# Patient Record
Sex: FEMALE | Race: WHITE | Employment: STUDENT | URBAN - METROPOLITAN AREA
[De-identification: names, ages, dates, MRNs, and addresses within clinical notes are randomized per-mention and may not be internally consistent; named-entity substitution may affect disease eponyms.]

---
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
## 2024-11-25 ENCOUNTER — LAB REQUISITION (OUTPATIENT)
Dept: LAB | Facility: HOSPITAL | Age: 19
End: 2024-11-25

## 2024-11-25 ENCOUNTER — LAB (OUTPATIENT)
Dept: LAB | Facility: LAB | Age: 19
End: 2024-11-25

## 2024-11-25 DIAGNOSIS — Z34.90 ENCOUNTER FOR SUPERVISION OF NORMAL PREGNANCY, UNSPECIFIED, UNSPECIFIED TRIMESTER: ICD-10-CM

## 2024-11-25 DIAGNOSIS — Z34.90 ENCOUNTER FOR SUPERVISION OF NORMAL PREGNANCY, UNSPECIFIED, UNSPECIFIED TRIMESTER: Primary | ICD-10-CM

## 2024-11-25 LAB
ABO GROUP (TYPE) IN BLOOD: NORMAL
ANTIBODY SCREEN: NORMAL
ERYTHROCYTE [DISTWIDTH] IN BLOOD BY AUTOMATED COUNT: 12.6 % (ref 11.5–14.5)
GLUCOSE 1H P 50 G GLC PO SERPL-MCNC: 122 MG/DL
HBV SURFACE AB SER-ACNC: <3.1 MIU/ML
HCT VFR BLD AUTO: 33.6 % (ref 36–46)
HCV AB SER QL: NONREACTIVE
HGB BLD-MCNC: 11.3 G/DL (ref 12–16)
HIV 1+2 AB+HIV1 P24 AG SERPL QL IA: NONREACTIVE
MCH RBC QN AUTO: 30.2 PG (ref 26–34)
MCHC RBC AUTO-ENTMCNC: 33.6 G/DL (ref 32–36)
MCV RBC AUTO: 90 FL (ref 80–100)
NRBC BLD-RTO: 0 /100 WBCS (ref 0–0)
PLATELET # BLD AUTO: 210 X10*3/UL (ref 150–450)
RBC # BLD AUTO: 3.74 X10*6/UL (ref 4–5.2)
REFLEX ADDED, ANEMIA PANEL: NORMAL
RH FACTOR (ANTIGEN D): NORMAL
RUBV IGG SERPL IA-ACNC: 1 IA
RUBV IGG SERPL QL IA: POSITIVE
TREPONEMA PALLIDUM IGG+IGM AB [PRESENCE] IN SERUM OR PLASMA BY IMMUNOASSAY: NONREACTIVE
WBC # BLD AUTO: 8.7 X10*3/UL (ref 4.4–11.3)

## 2024-11-25 PROCEDURE — 87491 CHLMYD TRACH DNA AMP PROBE: CPT

## 2024-11-25 PROCEDURE — 87389 HIV-1 AG W/HIV-1&-2 AB AG IA: CPT

## 2024-11-25 PROCEDURE — 86901 BLOOD TYPING SEROLOGIC RH(D): CPT

## 2024-11-25 PROCEDURE — 86317 IMMUNOASSAY INFECTIOUS AGENT: CPT

## 2024-11-25 PROCEDURE — 85027 COMPLETE CBC AUTOMATED: CPT

## 2024-11-25 PROCEDURE — 86706 HEP B SURFACE ANTIBODY: CPT

## 2024-11-25 PROCEDURE — 86780 TREPONEMA PALLIDUM: CPT

## 2024-11-25 PROCEDURE — 86900 BLOOD TYPING SEROLOGIC ABO: CPT

## 2024-11-25 PROCEDURE — 86803 HEPATITIS C AB TEST: CPT

## 2024-11-25 PROCEDURE — 87591 N.GONORRHOEAE DNA AMP PROB: CPT

## 2024-11-25 PROCEDURE — 82947 ASSAY GLUCOSE BLOOD QUANT: CPT

## 2024-11-25 PROCEDURE — 36415 COLL VENOUS BLD VENIPUNCTURE: CPT

## 2024-11-25 PROCEDURE — 86850 RBC ANTIBODY SCREEN: CPT

## 2024-11-25 PROCEDURE — 87086 URINE CULTURE/COLONY COUNT: CPT

## 2024-11-26 LAB
C TRACH RRNA SPEC QL NAA+PROBE: NEGATIVE
N GONORRHOEA DNA SPEC QL PROBE+SIG AMP: NEGATIVE

## 2024-11-27 LAB — BACTERIA UR CULT: NO GROWTH

## 2025-01-03 ENCOUNTER — LAB REQUISITION (OUTPATIENT)
Dept: LAB | Facility: HOSPITAL | Age: 20
End: 2025-01-03
Payer: COMMERCIAL

## 2025-01-03 DIAGNOSIS — Z34.90 ENCOUNTER FOR SUPERVISION OF NORMAL PREGNANCY, UNSPECIFIED, UNSPECIFIED TRIMESTER: ICD-10-CM

## 2025-01-03 PROCEDURE — 87081 CULTURE SCREEN ONLY: CPT

## 2025-01-05 LAB — GP B STREP GENITAL QL CULT: NORMAL

## 2025-01-06 LAB — GP B STREP GENITAL QL CULT: NORMAL

## 2025-01-20 ENCOUNTER — HOSPITAL ENCOUNTER (INPATIENT)
Facility: HOSPITAL | Age: 20
LOS: 1 days | Discharge: HOME | End: 2025-01-21
Attending: OBSTETRICS & GYNECOLOGY | Admitting: OBSTETRICS & GYNECOLOGY
Payer: COMMERCIAL

## 2025-01-20 ENCOUNTER — ANESTHESIA EVENT (OUTPATIENT)
Dept: OBSTETRICS AND GYNECOLOGY | Facility: HOSPITAL | Age: 20
End: 2025-01-20
Payer: COMMERCIAL

## 2025-01-20 ENCOUNTER — ANESTHESIA (OUTPATIENT)
Dept: OBSTETRICS AND GYNECOLOGY | Facility: HOSPITAL | Age: 20
End: 2025-01-20
Payer: COMMERCIAL

## 2025-01-20 ENCOUNTER — APPOINTMENT (OUTPATIENT)
Dept: OBSTETRICS AND GYNECOLOGY | Facility: HOSPITAL | Age: 20
End: 2025-01-20
Payer: COMMERCIAL

## 2025-01-20 PROBLEM — Z34.90 TERM PREGNANCY (HHS-HCC): Status: ACTIVE | Noted: 2025-01-20

## 2025-01-20 LAB
ABO GROUP (TYPE) IN BLOOD: NORMAL
ANTIBODY SCREEN: NORMAL
BB ANTIBODY IDENTIFICATION: NORMAL
CASE #: NORMAL
ERYTHROCYTE [DISTWIDTH] IN BLOOD BY AUTOMATED COUNT: 13.2 % (ref 11.5–14.5)
HCT VFR BLD AUTO: 33.9 % (ref 36–46)
HGB BLD-MCNC: 11.2 G/DL (ref 12–16)
MCH RBC QN AUTO: 29.5 PG (ref 26–34)
MCHC RBC AUTO-ENTMCNC: 33 G/DL (ref 32–36)
MCV RBC AUTO: 89 FL (ref 80–100)
NRBC BLD-RTO: 0 /100 WBCS (ref 0–0)
PLATELET # BLD AUTO: 220 X10*3/UL (ref 150–450)
RBC # BLD AUTO: 3.8 X10*6/UL (ref 4–5.2)
RH FACTOR (ANTIGEN D): NORMAL
TREPONEMA PALLIDUM IGG+IGM AB [PRESENCE] IN SERUM OR PLASMA BY IMMUNOASSAY: NONREACTIVE
WBC # BLD AUTO: 13 X10*3/UL (ref 4.4–11.3)

## 2025-01-20 PROCEDURE — 86780 TREPONEMA PALLIDUM: CPT | Mod: GEALAB | Performed by: OBSTETRICS & GYNECOLOGY

## 2025-01-20 PROCEDURE — 85027 COMPLETE CBC AUTOMATED: CPT | Performed by: OBSTETRICS & GYNECOLOGY

## 2025-01-20 PROCEDURE — 7210000002 HC LABOR PER HOUR

## 2025-01-20 PROCEDURE — 3E033VJ INTRODUCTION OF OTHER HORMONE INTO PERIPHERAL VEIN, PERCUTANEOUS APPROACH: ICD-10-PCS | Performed by: OBSTETRICS & GYNECOLOGY

## 2025-01-20 PROCEDURE — 01967 NEURAXL LBR ANES VAG DLVR: CPT | Performed by: NURSE ANESTHETIST, CERTIFIED REGISTERED

## 2025-01-20 PROCEDURE — 2500000004 HC RX 250 GENERAL PHARMACY W/ HCPCS (ALT 636 FOR OP/ED): Performed by: NURSE ANESTHETIST, CERTIFIED REGISTERED

## 2025-01-20 PROCEDURE — 3E0P7GC INTRODUCTION OF OTHER THERAPEUTIC SUBSTANCE INTO FEMALE REPRODUCTIVE, VIA NATURAL OR ARTIFICIAL OPENING: ICD-10-PCS | Performed by: OBSTETRICS & GYNECOLOGY

## 2025-01-20 PROCEDURE — 86870 RBC ANTIBODY IDENTIFICATION: CPT

## 2025-01-20 PROCEDURE — 3700000014 EPIDURAL BLOCK: Performed by: NURSE ANESTHETIST, CERTIFIED REGISTERED

## 2025-01-20 PROCEDURE — 59050 FETAL MONITOR W/REPORT: CPT

## 2025-01-20 PROCEDURE — 36415 COLL VENOUS BLD VENIPUNCTURE: CPT | Performed by: OBSTETRICS & GYNECOLOGY

## 2025-01-20 PROCEDURE — 1120000001 HC OB PRIVATE ROOM DAILY

## 2025-01-20 PROCEDURE — 10907ZC DRAINAGE OF AMNIOTIC FLUID, THERAPEUTIC FROM PRODUCTS OF CONCEPTION, VIA NATURAL OR ARTIFICIAL OPENING: ICD-10-PCS | Performed by: OBSTETRICS & GYNECOLOGY

## 2025-01-20 PROCEDURE — 2500000001 HC RX 250 WO HCPCS SELF ADMINISTERED DRUGS (ALT 637 FOR MEDICARE OP): Performed by: OBSTETRICS & GYNECOLOGY

## 2025-01-20 PROCEDURE — 2500000004 HC RX 250 GENERAL PHARMACY W/ HCPCS (ALT 636 FOR OP/ED): Performed by: OBSTETRICS & GYNECOLOGY

## 2025-01-20 PROCEDURE — 0HQ9XZZ REPAIR PERINEUM SKIN, EXTERNAL APPROACH: ICD-10-PCS | Performed by: OBSTETRICS & GYNECOLOGY

## 2025-01-20 PROCEDURE — 59409 OBSTETRICAL CARE: CPT | Performed by: OBSTETRICS & GYNECOLOGY

## 2025-01-20 PROCEDURE — 51701 INSERT BLADDER CATHETER: CPT

## 2025-01-20 PROCEDURE — 7100000016 HC LABOR RECOVERY PER HOUR

## 2025-01-20 PROCEDURE — 86850 RBC ANTIBODY SCREEN: CPT | Performed by: OBSTETRICS & GYNECOLOGY

## 2025-01-20 RX ORDER — LABETALOL HYDROCHLORIDE 5 MG/ML
20 INJECTION, SOLUTION INTRAVENOUS ONCE AS NEEDED
Status: DISCONTINUED | OUTPATIENT
Start: 2025-01-20 | End: 2025-01-21 | Stop reason: HOSPADM

## 2025-01-20 RX ORDER — ONDANSETRON HYDROCHLORIDE 2 MG/ML
4 INJECTION, SOLUTION INTRAVENOUS EVERY 6 HOURS PRN
Status: DISCONTINUED | OUTPATIENT
Start: 2025-01-20 | End: 2025-01-21 | Stop reason: HOSPADM

## 2025-01-20 RX ORDER — LIDOCAINE 560 MG/1
1 PATCH PERCUTANEOUS; TOPICAL; TRANSDERMAL
Status: DISCONTINUED | OUTPATIENT
Start: 2025-01-20 | End: 2025-01-21 | Stop reason: HOSPADM

## 2025-01-20 RX ORDER — POLYETHYLENE GLYCOL 3350 17 G/17G
17 POWDER, FOR SOLUTION ORAL 2 TIMES DAILY PRN
Status: DISCONTINUED | OUTPATIENT
Start: 2025-01-20 | End: 2025-01-21 | Stop reason: HOSPADM

## 2025-01-20 RX ORDER — BUPIVACAINE HYDROCHLORIDE 2.5 MG/ML
INJECTION, SOLUTION EPIDURAL; INFILTRATION; INTRACAUDAL AS NEEDED
Status: DISCONTINUED | OUTPATIENT
Start: 2025-01-20 | End: 2025-01-20

## 2025-01-20 RX ORDER — METHYLERGONOVINE MALEATE 0.2 MG/ML
0.2 INJECTION INTRAVENOUS ONCE AS NEEDED
Status: DISCONTINUED | OUTPATIENT
Start: 2025-01-20 | End: 2025-01-21 | Stop reason: HOSPADM

## 2025-01-20 RX ORDER — LIDOCAINE HYDROCHLORIDE 10 MG/ML
30 INJECTION, SOLUTION INFILTRATION; PERINEURAL ONCE AS NEEDED
Status: DISCONTINUED | OUTPATIENT
Start: 2025-01-20 | End: 2025-01-21 | Stop reason: HOSPADM

## 2025-01-20 RX ORDER — CARBOPROST TROMETHAMINE 250 UG/ML
250 INJECTION, SOLUTION INTRAMUSCULAR ONCE AS NEEDED
Status: DISCONTINUED | OUTPATIENT
Start: 2025-01-20 | End: 2025-01-21 | Stop reason: HOSPADM

## 2025-01-20 RX ORDER — LOPERAMIDE HYDROCHLORIDE 2 MG/1
4 CAPSULE ORAL EVERY 2 HOUR PRN
Status: DISCONTINUED | OUTPATIENT
Start: 2025-01-20 | End: 2025-01-21 | Stop reason: HOSPADM

## 2025-01-20 RX ORDER — BISACODYL 10 MG/1
10 SUPPOSITORY RECTAL DAILY PRN
Status: DISCONTINUED | OUTPATIENT
Start: 2025-01-20 | End: 2025-01-21 | Stop reason: HOSPADM

## 2025-01-20 RX ORDER — SIMETHICONE 80 MG
80 TABLET,CHEWABLE ORAL 4 TIMES DAILY PRN
Status: DISCONTINUED | OUTPATIENT
Start: 2025-01-20 | End: 2025-01-21 | Stop reason: HOSPADM

## 2025-01-20 RX ORDER — TERBUTALINE SULFATE 1 MG/ML
0.25 INJECTION SUBCUTANEOUS ONCE AS NEEDED
Status: DISCONTINUED | OUTPATIENT
Start: 2025-01-20 | End: 2025-01-21 | Stop reason: HOSPADM

## 2025-01-20 RX ORDER — ONDANSETRON 4 MG/1
4 TABLET, FILM COATED ORAL EVERY 6 HOURS PRN
Status: DISCONTINUED | OUTPATIENT
Start: 2025-01-20 | End: 2025-01-21 | Stop reason: HOSPADM

## 2025-01-20 RX ORDER — NIFEDIPINE 10 MG/1
10 CAPSULE ORAL ONCE AS NEEDED
Status: DISCONTINUED | OUTPATIENT
Start: 2025-01-20 | End: 2025-01-21 | Stop reason: HOSPADM

## 2025-01-20 RX ORDER — OXYTOCIN/0.9 % SODIUM CHLORIDE 30/500 ML
60 PLASTIC BAG, INJECTION (ML) INTRAVENOUS ONCE AS NEEDED
Status: DISCONTINUED | OUTPATIENT
Start: 2025-01-20 | End: 2025-01-21 | Stop reason: HOSPADM

## 2025-01-20 RX ORDER — OXYTOCIN 10 [USP'U]/ML
10 INJECTION, SOLUTION INTRAMUSCULAR; INTRAVENOUS ONCE AS NEEDED
Status: DISCONTINUED | OUTPATIENT
Start: 2025-01-20 | End: 2025-01-21 | Stop reason: HOSPADM

## 2025-01-20 RX ORDER — HYDRALAZINE HYDROCHLORIDE 20 MG/ML
5 INJECTION INTRAMUSCULAR; INTRAVENOUS ONCE AS NEEDED
Status: DISCONTINUED | OUTPATIENT
Start: 2025-01-20 | End: 2025-01-21 | Stop reason: HOSPADM

## 2025-01-20 RX ORDER — OXYTOCIN 10 [USP'U]/ML
10 INJECTION, SOLUTION INTRAMUSCULAR; INTRAVENOUS ONCE AS NEEDED
Status: COMPLETED | OUTPATIENT
Start: 2025-01-20 | End: 2025-01-20

## 2025-01-20 RX ORDER — OXYTOCIN/0.9 % SODIUM CHLORIDE 30/500 ML
2-30 PLASTIC BAG, INJECTION (ML) INTRAVENOUS CONTINUOUS
Status: DISCONTINUED | OUTPATIENT
Start: 2025-01-20 | End: 2025-01-21 | Stop reason: HOSPADM

## 2025-01-20 RX ORDER — MISOPROSTOL 200 UG/1
800 TABLET ORAL ONCE AS NEEDED
Status: DISCONTINUED | OUTPATIENT
Start: 2025-01-20 | End: 2025-01-21 | Stop reason: HOSPADM

## 2025-01-20 RX ORDER — TRANEXAMIC ACID 100 MG/ML
1000 INJECTION, SOLUTION INTRAVENOUS ONCE AS NEEDED
Status: DISCONTINUED | OUTPATIENT
Start: 2025-01-20 | End: 2025-01-21 | Stop reason: HOSPADM

## 2025-01-20 RX ORDER — ACETAMINOPHEN 325 MG/1
975 TABLET ORAL EVERY 6 HOURS
Status: DISCONTINUED | OUTPATIENT
Start: 2025-01-20 | End: 2025-01-21 | Stop reason: HOSPADM

## 2025-01-20 RX ORDER — FENTANYL/ROPIVACAINE/NS/PF 2MCG/ML-.2
0-25 PLASTIC BAG, INJECTION (ML) INJECTION CONTINUOUS
Status: DISCONTINUED | OUTPATIENT
Start: 2025-01-20 | End: 2025-01-21 | Stop reason: HOSPADM

## 2025-01-20 RX ORDER — LIDOCAINE HCL/EPINEPHRINE/PF 2%-1:200K
VIAL (ML) INJECTION AS NEEDED
Status: DISCONTINUED | OUTPATIENT
Start: 2025-01-20 | End: 2025-01-20

## 2025-01-20 RX ORDER — METOCLOPRAMIDE HYDROCHLORIDE 5 MG/ML
10 INJECTION INTRAMUSCULAR; INTRAVENOUS EVERY 6 HOURS PRN
Status: DISCONTINUED | OUTPATIENT
Start: 2025-01-20 | End: 2025-01-21 | Stop reason: HOSPADM

## 2025-01-20 RX ORDER — ADHESIVE BANDAGE
10 BANDAGE TOPICAL
Status: DISCONTINUED | OUTPATIENT
Start: 2025-01-20 | End: 2025-01-21 | Stop reason: HOSPADM

## 2025-01-20 RX ORDER — METOCLOPRAMIDE 10 MG/1
10 TABLET ORAL EVERY 6 HOURS PRN
Status: DISCONTINUED | OUTPATIENT
Start: 2025-01-20 | End: 2025-01-21 | Stop reason: HOSPADM

## 2025-01-20 RX ORDER — DIPHENHYDRAMINE HYDROCHLORIDE 50 MG/ML
25 INJECTION INTRAMUSCULAR; INTRAVENOUS EVERY 6 HOURS PRN
Status: DISCONTINUED | OUTPATIENT
Start: 2025-01-20 | End: 2025-01-21 | Stop reason: HOSPADM

## 2025-01-20 RX ORDER — DIPHENHYDRAMINE HCL 25 MG
25 CAPSULE ORAL EVERY 6 HOURS PRN
Status: DISCONTINUED | OUTPATIENT
Start: 2025-01-20 | End: 2025-01-21 | Stop reason: HOSPADM

## 2025-01-20 RX ORDER — IBUPROFEN 600 MG/1
600 TABLET ORAL EVERY 6 HOURS
Status: DISCONTINUED | OUTPATIENT
Start: 2025-01-20 | End: 2025-01-21 | Stop reason: HOSPADM

## 2025-01-20 RX ORDER — WATER
125 LIQUID (ML) MISCELLANEOUS
Status: DISCONTINUED | OUTPATIENT
Start: 2025-01-20 | End: 2025-01-21 | Stop reason: HOSPADM

## 2025-01-20 RX ADMIN — OXYTOCIN 10 UNITS: 10 INJECTION INTRAVENOUS at 13:56

## 2025-01-20 RX ADMIN — BUPIVACAINE HYDROCHLORIDE 8 ML: 2.5 INJECTION, SOLUTION EPIDURAL; INFILTRATION; INTRACAUDAL; PERINEURAL at 12:15

## 2025-01-20 RX ADMIN — MISOPROSTOL 25 MCG: 100 TABLET ORAL at 07:19

## 2025-01-20 RX ADMIN — ACETAMINOPHEN 975 MG: 325 TABLET, FILM COATED ORAL at 19:36

## 2025-01-20 RX ADMIN — IBUPROFEN 600 MG: 600 TABLET, FILM COATED ORAL at 19:36

## 2025-01-20 RX ADMIN — LIDOCAINE HYDROCHLORIDE AND EPINEPHRINE 5 ML: 20; 5 INJECTION, SOLUTION EPIDURAL; INFILTRATION; INTRACAUDAL; PERINEURAL at 10:37

## 2025-01-20 RX ADMIN — Medication 10 ML/HR: at 10:45

## 2025-01-20 RX ADMIN — BUPIVACAINE HYDROCHLORIDE 2 ML: 2.5 INJECTION, SOLUTION EPIDURAL; INFILTRATION; INTRACAUDAL; PERINEURAL at 10:43

## 2025-01-20 RX ADMIN — SODIUM CHLORIDE 500 ML: 9 INJECTION, SOLUTION INTRAVENOUS at 09:38

## 2025-01-20 SDOH — HEALTH STABILITY: MENTAL HEALTH: HAVE YOU USED ANY PRESCRIPTION DRUGS OTHER THAN PRESCRIBED IN THE PAST 12 MONTHS?: NO

## 2025-01-20 SDOH — SOCIAL STABILITY: SOCIAL INSECURITY: ARE THERE ANY APPARENT SIGNS OF INJURIES/BEHAVIORS THAT COULD BE RELATED TO ABUSE/NEGLECT?: NO

## 2025-01-20 SDOH — HEALTH STABILITY: MENTAL HEALTH: WERE YOU ABLE TO COMPLETE ALL THE BEHAVIORAL HEALTH SCREENINGS?: YES

## 2025-01-20 SDOH — ECONOMIC STABILITY: HOUSING INSECURITY: DO YOU FEEL UNSAFE GOING BACK TO THE PLACE WHERE YOU ARE LIVING?: NO

## 2025-01-20 SDOH — SOCIAL STABILITY: SOCIAL INSECURITY: HAVE YOU HAD THOUGHTS OF HARMING ANYONE ELSE?: NO

## 2025-01-20 SDOH — HEALTH STABILITY: MENTAL HEALTH: WISH TO BE DEAD (PAST 1 MONTH): NO

## 2025-01-20 SDOH — SOCIAL STABILITY: SOCIAL INSECURITY: DOES ANYONE TRY TO KEEP YOU FROM HAVING/CONTACTING OTHER FRIENDS OR DOING THINGS OUTSIDE YOUR HOME?: NO

## 2025-01-20 SDOH — SOCIAL STABILITY: SOCIAL INSECURITY: HAVE YOU HAD ANY THOUGHTS OF HARMING ANYONE ELSE?: NO

## 2025-01-20 SDOH — SOCIAL STABILITY: SOCIAL INSECURITY: PHYSICAL ABUSE: DENIES

## 2025-01-20 SDOH — SOCIAL STABILITY: SOCIAL INSECURITY: DO YOU FEEL ANYONE HAS EXPLOITED OR TAKEN ADVANTAGE OF YOU FINANCIALLY OR OF YOUR PERSONAL PROPERTY?: NO

## 2025-01-20 SDOH — HEALTH STABILITY: MENTAL HEALTH: HAVE YOU USED ANY SUBSTANCES (CANABIS, COCAINE, HEROIN, HALLUCINOGENS, INHALANTS, ETC.) IN THE PAST 12 MONTHS?: NO

## 2025-01-20 SDOH — HEALTH STABILITY: MENTAL HEALTH: SUICIDAL BEHAVIOR (LIFETIME): NO

## 2025-01-20 SDOH — SOCIAL STABILITY: SOCIAL INSECURITY: ABUSE SCREEN: ADULT

## 2025-01-20 SDOH — HEALTH STABILITY: MENTAL HEALTH: NON-SPECIFIC ACTIVE SUICIDAL THOUGHTS (PAST 1 MONTH): NO

## 2025-01-20 SDOH — SOCIAL STABILITY: SOCIAL INSECURITY: VERBAL ABUSE: DENIES

## 2025-01-20 SDOH — SOCIAL STABILITY: SOCIAL INSECURITY: ARE YOU OR HAVE YOU BEEN THREATENED OR ABUSED PHYSICALLY, EMOTIONALLY, OR SEXUALLY BY ANYONE?: NO

## 2025-01-20 SDOH — SOCIAL STABILITY: SOCIAL INSECURITY: HAS ANYONE EVER THREATENED TO HURT YOUR FAMILY OR YOUR PETS?: NO

## 2025-01-20 ASSESSMENT — ACTIVITIES OF DAILY LIVING (ADL): LACK_OF_TRANSPORTATION: NO

## 2025-01-20 ASSESSMENT — PAIN SCALES - GENERAL
PAINLEVEL_OUTOF10: 1
PAIN_LEVEL: 0
PAINLEVEL_OUTOF10: 0 - NO PAIN
PAINLEVEL_OUTOF10: 3

## 2025-01-20 ASSESSMENT — LIFESTYLE VARIABLES
AUDIT-C TOTAL SCORE: 0
AUDIT-C TOTAL SCORE: 0
HOW OFTEN DO YOU HAVE 6 OR MORE DRINKS ON ONE OCCASION: NEVER
HOW OFTEN DO YOU HAVE A DRINK CONTAINING ALCOHOL: NEVER
HOW MANY STANDARD DRINKS CONTAINING ALCOHOL DO YOU HAVE ON A TYPICAL DAY: PATIENT DOES NOT DRINK
SKIP TO QUESTIONS 9-10: 1

## 2025-01-20 ASSESSMENT — PATIENT HEALTH QUESTIONNAIRE - PHQ9
2. FEELING DOWN, DEPRESSED OR HOPELESS: NOT AT ALL
SUM OF ALL RESPONSES TO PHQ9 QUESTIONS 1 & 2: 0
1. LITTLE INTEREST OR PLEASURE IN DOING THINGS: NOT AT ALL

## 2025-01-20 ASSESSMENT — PAIN DESCRIPTION - DESCRIPTORS: DESCRIPTORS: CRAMPING

## 2025-01-20 ASSESSMENT — PAIN DESCRIPTION - LOCATION: LOCATION: ABDOMEN

## 2025-01-20 NOTE — ANESTHESIA PROCEDURE NOTES
Epidural Block    Patient location during procedure: OB  Start time: 1/20/2025 10:33 AM  End time: 1/20/2025 11:37 AM  Reason for block: labor analgesia  Staffing  Performed: CRNA   Authorized by: MARGARITA Bishop    Performed by: MARGARITA Bishop    Preanesthetic Checklist  Completed: patient identified, IV checked, risks and benefits discussed, surgical consent, pre-op evaluation, timeout performed and sterile techniques followed  Block Timeout  RN/Licensed healthcare professional reads aloud to the Anesthesia provider and entire team: Patient identity, procedure with side and site, patient position, and as applicable the availability of implants/special equipment/special requirements.  Patient on coagulant treatment: no  Timeout performed at: 1/20/2025 10:33 AM  Block Placement  Patient position: sitting  Prep: ChloraPrep  Sterility prep: drape, gloves and mask  Sedation level: no sedation  Patient monitoring: blood pressure, continuous pulse oximetry and heart rate  Approach: midline  Local numbing: lidocaine 1% to skin and subcutaneous tissues  Vertebral space: lumbar  Lumbar location: L4-L5  Epidural  Loss of resistance technique: saline  Guidance: landmark technique        Needle  Needle type: Tuohy   Needle gauge: 18  Catheter type: multi-orifice  Catheter size: 20 G  Catheter at skin depth: 12 cm  Catheter securement method: clear occlusive dressing    Test dose: lidocaine 1.5% with epinephrine 1-to-200,000  Test dose: lidocaine 1.5% with epinephrine 1-to-200,000  Test dose result: no positive test dose            Assessment  Sensory level: T6 bilateral  Block outcome: patient comfortable  Number of attempts: 1  Events: no positive test dose  Procedure assessment: patient tolerated procedure well with no immediate complications

## 2025-01-20 NOTE — LACTATION NOTE
"This note was copied from a baby's chart.  Lactation Consultant Note  Lactation Consultation  Reason for Consult: Initial assessment  Consultant Name: KALINA Narvaez IBCLC    Maternal Information  Has mother  before?: Yes  How long did the mother previously breastfeed?: 3 months  Infant to breast within first 2 hours of birth?: Yes  Exclusive Pump and Bottle Feed: No    Maternal Assessment  Breast Assessment: Medium, Soft, Warm, Compressible  Nipple Assessment: Intact, Erect  Areola Assessment: Normal    Infant Assessment  Infant Behavior: Awake, Active alert, Fussy    Feeding Assessment  Nutrition Source: Breastmilk  Feeding Method: Nursing at the breast  Feeding Position: C - hold, Cradle, Skin to skin  Suck/Feeding: Sustained, Coordinated suck/swallow/breathe, Baby led rhythmically, Audible swallowing with stimulaton  Latch Assessment: Moderate assistance is needed, Deep latch obtained, Latch achieved after repeated attempts, Comfortable with no pain, Flanged lips    LATCH TOOL  Latch: Grasps breast, tongue down, lips flanged, rhythmic sucking  Audible Swallowing: A few with stimulation  Type of Nipple: Everted (After stimulation)  Comfort (Breast/Nipple): Soft/non-tender  Hold (Positioning): Minimal assist, teach one side, mother does other, staff holds  LATCH Score: 8    Breast Pump       Other OB Lactation Tools       Patient Follow-up  Inpatient Lactation Follow-up Needed : Yes  Outpatient Lactation Follow-up: Recommended    Other OB Lactation Documentation       Recommendations/Summary  LC at bedside for initial lactation consultation. Mother reports that she plans to breastfeed infant for at least 3 months. Mother reports that she breast fed her first infant for 3 months and had no major issues. Mother denies any breast changes during this pregnancy and also denies any history of breast surgery. Mother has a breast pump at home for personal use. CDC\"s How to Keep Your Breast Pump Kit Clean handout given " and reviewed.  Mother has infant skin to skin at this time. Infant is showing hunger cues. LC reviewed hunger cues and rousing techniques with mother and grandmother. Mother moved infant down to cradle hold on her left breast. LC reviewed belly to belly and nipple to nose with mother. LC also reviewed that her nipple needs to hit the roof of infant's mouth to stimulate him to suck. Infant was inially too high over the breast and infant was attempting to latch, but could not register the nipple. LC had mother bring infant down and align him nipple to nose. Infant then able to latch to breast. LC encouraged mother to hold and shape her breast to encouraged and maintain a deep latch. Deep latch reviewed at this time and infant has both lips flanged. Mother reports latch as comfortable and gentle tug. Normal  behavior reviewed with mother and grandmother at this time. LC encouraged mother to wake infant every 2-3 hours to feed ( or sooner with cues). Hand expression reviewed at this time and mother was encouraged to utilize hand expression if infant is sleepy or does not feed well over the next 24 hours. Hand expression kit at bedside.   Infant continues to feed actively on left breast. Mother was asked if she had any questions or concerns at this time. Mother denies any questions or concerns. LC to return in 20 minutes to assess how feeding is going.     Update at 1538: Infant continues to feed well on left breast. Mother declines needing any assistance at this time.    Offered ongoing assistance with lactation.

## 2025-01-20 NOTE — H&P
OB Admission H&P    Assessment/Plan    Dina Goncalves is a 19 y.o.  at 39w0d, MARIUSZ: 2025, by Ultrasound, who presents for Induction of Labor.    Plan  -Admit to L&D, consented  -T&S, CBC, and Syphilis  -Epidural at patient request  -Recheck as clinically indicated by maternal or fetal status  -Plan to initiate induction with cytotec      Fetal Status  -NST reactive, reassuring   -Presentation vertex based on Leopold's maneuver  -EFW 6lb8oz by leopold  -GBS neg    Postpartum  Contraception Plan: patient declined    Pregnancy Problems (from 25 to present)       No problems associated with this episode.            Subjective   Good fetal movement.  Denies vaginal bleeding., Denies contractions., Denies leaking of fluid.      Prenatal Provider Ayden Arriaga MD      OB History    Para Term  AB Living   2 1 1 0 0 1   SAB IAB Ectopic Multiple Live Births   0 0 0 0 1      # Outcome Date GA Lbr Loc/2nd Weight Sex Type Anes PTL Lv   2 Current            1 Term 23 39w0d  2.835 kg F Vag-Spont EPI N GRETA       No past surgical history on file.    Social History     Tobacco Use    Smoking status: Never    Smokeless tobacco: Never   Substance Use Topics    Alcohol use: Not Currently       No Known Allergies    Medications Prior to Admission   Medication Sig Dispense Refill Last Dose/Taking    prenatal vit no.124/iron/folic (PRENATAL VITAMIN ORAL) Take 1 tablet by mouth once daily.        Objective     Last Vitals  Temp Pulse Resp BP MAP O2 Sat   37.1 °C (98.8 °F) 81 16 109/70 84 98 %     Blood Pressures         2025  0627 2025  0810          BP: 116/76 109/70               Physical Exam  General: NAD, mood appropriate  Cardiopulmonary: warm and well perfused, breathing comfortably on room air  Abdomen: Gravid, non-tender  Extremities: Symmetric  Speculum Exam: deferred  Cervix: 3 / 70/-1      Fetal Monitoring  Baseline: 150 bpm, Variability: moderate,  Accelerations:  present and Decelerations: none  Uterine Activity: Irregular contractions  Interpretation: Reactive    Bedside ultrasound: No    Labs in chart were reviewed.   Results from last 7 days   Lab Units 01/20/25  0637   WBC AUTO x10*3/uL 13.0*   HEMOGLOBIN g/dL 11.2*   HEMATOCRIT % 33.9*   PLATELETS AUTO x10*3/uL 220

## 2025-01-20 NOTE — PROGRESS NOTES
Feeling comfortable with her epidural.    FHR: Category 1, moderate variability, no decelerations  Contractions every 3 minutes  Cervix: 6/90/-1  AROM done for clear fluid    S/p one dose of Cytotec, no further medication needed at this time for induction. Continue with expectant management.  Dr. Arriaga updated.

## 2025-01-20 NOTE — ANESTHESIA PREPROCEDURE EVALUATION
Patient: Dina Goncalves    Evaluation Method: In-person visit    Procedure Information    Date: 01/20/25  Procedure: Labor Analgesia         Relevant Problems   Anesthesia (within normal limits)      Cardiac (within normal limits)      Pulmonary (within normal limits)      Neuro (within normal limits)      GI (within normal limits)      /Renal (within normal limits)      Liver (within normal limits)      Endocrine (within normal limits)      Hematology (within normal limits)      Musculoskeletal (within normal limits)      HEENT (within normal limits)      ID (within normal limits)      Skin (within normal limits)      GYN (within normal limits)       Clinical information reviewed:    Allergies  Meds  Problems              NPO Detail:  No data recorded     OB/Gyn Evaluation    Present Pregnancy    Patient is pregnant now.   Obstetric History                Physical Exam    Airway  Mallampati: II  TM distance: >3 FB  Neck ROM: full     Cardiovascular   Rate: normal     Dental - normal exam     Pulmonary    Abdominal            Anesthesia Plan    History of general anesthesia?: no  History of complications of general anesthesia?: no    ASA 2     epidural     Anesthetic plan and risks discussed with patient.    Plan discussed with CRNA.

## 2025-01-21 VITALS
OXYGEN SATURATION: 99 % | RESPIRATION RATE: 20 BRPM | DIASTOLIC BLOOD PRESSURE: 65 MMHG | BODY MASS INDEX: 20.67 KG/M2 | HEIGHT: 69 IN | SYSTOLIC BLOOD PRESSURE: 111 MMHG | WEIGHT: 139.55 LBS | TEMPERATURE: 98.2 F | HEART RATE: 78 BPM

## 2025-01-21 PROBLEM — Z34.90 TERM PREGNANCY (HHS-HCC): Status: RESOLVED | Noted: 2025-01-20 | Resolved: 2025-01-21

## 2025-01-21 PROCEDURE — 2500000004 HC RX 250 GENERAL PHARMACY W/ HCPCS (ALT 636 FOR OP/ED): Performed by: OBSTETRICS & GYNECOLOGY

## 2025-01-21 PROCEDURE — 2500000001 HC RX 250 WO HCPCS SELF ADMINISTERED DRUGS (ALT 637 FOR MEDICARE OP): Performed by: OBSTETRICS & GYNECOLOGY

## 2025-01-21 RX ADMIN — ACETAMINOPHEN 975 MG: 325 TABLET, FILM COATED ORAL at 14:07

## 2025-01-21 RX ADMIN — IBUPROFEN 600 MG: 600 TABLET, FILM COATED ORAL at 01:40

## 2025-01-21 RX ADMIN — IBUPROFEN 600 MG: 600 TABLET, FILM COATED ORAL at 07:41

## 2025-01-21 RX ADMIN — IBUPROFEN 600 MG: 600 TABLET, FILM COATED ORAL at 14:06

## 2025-01-21 RX ADMIN — ACETAMINOPHEN 975 MG: 325 TABLET, FILM COATED ORAL at 01:40

## 2025-01-21 RX ADMIN — ACETAMINOPHEN 975 MG: 325 TABLET, FILM COATED ORAL at 07:42

## 2025-01-21 RX ADMIN — POLYETHYLENE GLYCOL 3350 17 G: 17 POWDER, FOR SOLUTION ORAL at 14:38

## 2025-01-21 ASSESSMENT — PAIN DESCRIPTION - ORIENTATION: ORIENTATION: LOWER

## 2025-01-21 ASSESSMENT — PAIN SCALES - PAIN ASSESSMENT IN ADVANCED DEMENTIA (PAINAD)
FACIALEXPRESSION: SMILING OR INEXPRESSIVE
BREATHING: NORMAL
BODYLANGUAGE: RELAXED

## 2025-01-21 ASSESSMENT — PAIN SCALES - GENERAL
PAINLEVEL_OUTOF10: 0 - NO PAIN
PAINLEVEL_OUTOF10: 0 - NO PAIN
PAINLEVEL_OUTOF10: 5 - MODERATE PAIN
PAINLEVEL_OUTOF10: 0 - NO PAIN

## 2025-01-21 ASSESSMENT — PAIN DESCRIPTION - LOCATION: LOCATION: ABDOMEN

## 2025-01-21 NOTE — CARE PLAN
The patient's goals for the shift include breastfeeding and rest    The clinical goals for the shift include progress with breastfeeding    Over the shift, the patient did make progress toward the following goals. VSS, lochia WDL, pt ambulating to bathroom and voiding. Breastfeeding with assistance from staff. Pain control adequate with scheduled meds.
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
## 2025-01-21 NOTE — L&D DELIVERY NOTE
OB Delivery Note  2025  Dina Goncalves  19 y.o.   Vaginal, Spontaneous       Gestational Age: 39w0d  /Para:   Quantitative Blood Loss: Admission to Discharge: 120 mL (2025  5:59 AM - 2025  8:05 AM)    Bonnie Goncalves [[id number]]      Labor Events    Sac identifier: Sac 1  Rupture type: Spontaneous  Fluid color: Clear  Fluid odor: None  Labor type: Induced Onset of Labor  Labor allowed to proceed with plans for an attempted vaginal birth?: Yes  Induction: Misoprostol  Induction indications: Risk Reducing  Complications: None       Labor Length    3rd stage: 0h 12m       Placenta    Placenta delivery date/time: 2025 1404  Placenta removal: Spontaneous  Placenta appearance: Intact       Cord    Vessels: 3 vessels  Delayed cord clamping?: Yes  Cord blood disposition: Lab  Gases sent?: No       Lacerations    Episiotomy: None  Perineal laceration: 1st  Perineal laceration repaired?: Yes  Other lacerations?: No  Repair suture: 3-0 Synthetic Suture       Anesthesia    Method: Epidural       Operative Delivery    Forceps attempted?: No  Vacuum extractor attempted?: No       Shoulder Dystocia    Shoulder dystocia present?: No        Delivery    Birth date/time: 2025 13:52:00  Delivery type: Vaginal, Spontaneous  Complications: None       Resuscitation    Method: None       Apgars    Living status: Living  Apgar Component Scores:  1 min.:  5 min.:  10 min.:  15 min.:  20 min.:    Skin color:  1  1       Heart rate:  2  2       Reflex irritability:  2  2       Muscle tone:  2  2       Respiratory effort:  2  2       Total:  9  9       Apgars assigned by: SHO       Delivery Providers    Delivering clinician: Ayden Arriaga MD   Provider Role    Cata Lester RN Delivery Nurse    Luisana Win RN Nursery Nurse     Resident                     Ayden Arriaga MD

## 2025-01-21 NOTE — DISCHARGE SUMMARY
Discharge Summary    Admission Date: 1/20/2025  Discharge Date: 1/21/2025    Discharge Diagnosis  Spontaneous vaginal delivery ([facility])    Hospital Course  Delivery Date: 1/20/2025 1:52 PM  Delivery type: Vaginal, Spontaneous   GA at delivery: 39w0d  Outcome: Living  Anesthesia during delivery: Epidural  Intrapartum complications: None  Feeding method: Breastfeeding Status: Yes     Procedures: none  Contraception at discharge: condoms    Pertinent Physical Exam At Time of Discharge    Last Vitals:  Temp Pulse Resp BP MAP Pulse Ox   36.8 °C (98.2 °F) 70 18 113/77 89 98 %     Discharge Meds     Your medication list        CONTINUE taking these medications        Instructions Last Dose Given Next Dose Due   PRENATAL VITAMIN ORAL                   Complications Requiring Follow-Up  Routine pp care    Test Results Pending At Discharge  Pending Labs       No current pending labs.          Outpatient Follow-Up  No future appointments.      Ayden Arriaga MD

## 2025-01-21 NOTE — LACTATION NOTE
This note was copied from a baby's chart.  Lactation Consultant Note     01/21/25 1124   Lactation Consultation   Reason for Consult Follow-up assessment   Consultant Name SAMUEL Quijano RN, IBCLC   Maternal Information   Has mother  before? Yes   How long did the mother previously breastfeed? 3 months   Infant to breast within first 2 hours of birth? Yes   Exclusive Pump and Bottle Feed No   Maternal Assessment   Breast Assessment Medium;Soft;Warm;Compressible;No breast changes observed in pregnancy   Nipple Assessment Intact;Erect   Areola Assessment Normal   Infant Assessment   Infant Behavior Sleepy   Infant Assessment   (39 weeks, approximately 22 HOL, 5 voids/3 stools since delivery, -2.55% weight loss @12 HOL)   Feeding Assessment   Nutrition Source Breastmilk   Feeding Method Nursing at the breast   Feeding Position Breast sandwich;Cradle;Cross - cradle;Both sides;Nipple to nose;Mother needs assistance with latch/positioning;Mother demonstrates good positioning   Suck/Feeding Sustained;Coordinated suck/swallow/breathe;Baby led rhythmically;Tactile stimulation needed   Latch Assessment Minimal assistance is needed;Instructed on deep latch;Eagerly grasped on to latch;Deep latch obtained;Optimal angle of mouth opening;Comfortable with no pain;Sucking and swallowing;Sucks with long jaw movement;Bursts of sucking, swallowing, and rest;Flanged lips;Chin moves in rhythmic motion;Comfortable latch   LATCH Tool   Latch 2   Audible Swallowing 1   Type of Nipple 2   Comfort (Breast/Nipple) 2   Hold (Positioning) 1   LATCH Score 8   Breast Pump   Pump   (Mother states she has a breast pump for home use and has a manual pump at the bedside.)   Other OB Lactation Tools   Lactation Tools Lanolin   Patient Follow-Up   Inpatient Lactation Follow-up Needed    (as needed prior to discharge)   Outpatient Lactation Follow-up Recommended  (Contact information provided.)   Lactation Professional - OK to Discharge Yes      Recommendations/Summary  18 y/o  experienced breastfeeding mother with vaginal delivery of full term  boy approximately 22 hours ago. Mother states she plans to breastfeed this  for approximately 3 months as she did with her now almost 1 year old. Mother denies breast changes during pregnancy and denies history of breast surgery. Mother states she has a pump at home.     LC to bedside to assess breastfeeding progress and review discharge education. Mother states  has been nursing well, particularly for the last two feeds. Mother denies pain or breakdown with latching and states she thinks  is beginning to cluster feed. Mother also states she is feeling [place] today. LC encouraged mother to call LC if she wishes to have a feed assessed prior to discharge. Mother agreeable. LC began reviewing discharge education before social work came to bedside for consult. LC to return when SW is done. Offered ongoing assistance with breastfeeding. Mother denies further questions or concerns at this time.    1225 LC back to bedside. Mother using manual pump to express breast milk while  is swaddled and held by family member. Mother states she tried waking  but that he was too sleepy to latch. LC offered to assist with waking techniques. Mother receptive as she states she would rather latch  than pump. LC unswaddled and undressed  in bassinet, diaper checked and overhead lights turned on.  awake and alert with stimulation. LC then placed  with mother and encouraged mother to latch  as she has been and LC to assist as needed. Mother agreeable. Mother latching  in cradle hold with breast shaping. Tunas sleepy at first but quickly obtained a deep latch. Mother states latch feels slightly pinchy. LC unlatched  with a gloved finger and then guided mother to reposition to cross cradle with breast shaping.  eager and a deep latch was  obtained. Mother states latch is more comfortable than previous latch. Deep latch observed with active sucking and swallowing, lips flanged and long jaw movements. LC reviewed importance of allowing  to nurse at each breast until  appears satiated. Mother states understanding.     Discharge education reviewed at this time. Reviewed benefits of breastfeeding, milk production, feeding cues and waking techniques. Reviewed signs of a deep and comfortable latch and how to tell  is eating adequately. Reviewed adequate intake and output. Reviewed cluster feeding and frequency of feeds. Reviewed when to begin pumping and cleaning of pump parts. Reviewed nipple care and how to prevent and treat engorgement, clogged ducts and mastitis. Mother states she did have engorgement with her first infant and that the pumped to relieve this. LC provided guidance on expressing enough breast milk for comfort but not to empty the breast. Maternal grandmother at bedside and asking appropriate questions about breast milk supply, as mother seemed to have a low supply with her first infant. LC reviewed on demand nursing and not offering pacifiers/bottles too early. Literature also provided. Mother encouraged to follow up with outpatient lactation. Contact information provided. Mother agreeable to call if needed.  continuing to nurse at this time. LC encouraged mother ot call should she need assistance latching  to the left breast. Mother states understanding. Ongoing assistance offered prior to discharge. Mother denies questions or concerns at this time.

## 2025-01-21 NOTE — ANESTHESIA POSTPROCEDURE EVALUATION
Patient: Dina Goncalves    Procedure Summary       Date: 01/20/25 Room / Location:     Anesthesia Start: 1033 Anesthesia Stop: 1352    Procedure: Labor Analgesia Diagnosis:     Scheduled Providers:  Responsible Provider: MARGARITA Bishop    Anesthesia Type: epidural ASA Status: 2            Anesthesia Type: epidural    Visit Vitals  /71   Pulse 102   Temp 37 °C (98.6 °F)   Resp 16      Anesthesia Post Evaluation    Patient location during evaluation: bedside  Patient participation: complete - patient participated  Level of consciousness: awake and alert  Pain score: 0  Pain management: satisfactory to patient  Multimodal analgesia pain management approach  Airway patency: patent  Two or more strategies used to mitigate risk of obstructive sleep apnea  Cardiovascular status: acceptable  Respiratory status: acceptable  Hydration status: acceptable  Postoperative Nausea and Vomiting: none        No notable events documented.

## 2025-01-21 NOTE — CARE PLAN
The patient's goals for the shift include discharge home today    The clinical goals for the shift include Pain level less than 4; patient will progress activity and demonstrate confidence with self care    VSS. Pain well controlled with Tylenol and Ibuprofen. Independent with self care. Verbalizes understanding of follow up recommendations  Problem: Postpartum  Goal: Experiences normal postpartum course  Outcome: Met  Goal: Appropriate maternal -  bonding  Outcome: Met  Goal: Establish and maintain infant feeding pattern for adequate nutrition  Outcome: Met  Goal: No s/sx infection  Outcome: Met  Goal: No s/sx of hemorrhage  Outcome: Met     Problem: Pain - Adult  Goal: Verbalizes/displays adequate comfort level or baseline comfort level  Outcome: Met     Problem: Safety - Adult  Goal: Free from fall injury  Outcome: Met

## 2025-02-17 ENCOUNTER — APPOINTMENT (OUTPATIENT)
Facility: CLINIC | Age: 20
End: 2025-02-17
Payer: COMMERCIAL

## 2025-02-17 VITALS
WEIGHT: 126 LBS | BODY MASS INDEX: 18.66 KG/M2 | HEIGHT: 69 IN | SYSTOLIC BLOOD PRESSURE: 120 MMHG | DIASTOLIC BLOOD PRESSURE: 74 MMHG

## 2025-02-17 PROCEDURE — 88175 CYTOPATH C/V AUTO FLUID REDO: CPT

## 2025-02-17 PROCEDURE — 99024 POSTOP FOLLOW-UP VISIT: CPT | Performed by: OBSTETRICS & GYNECOLOGY

## 2025-02-17 PROCEDURE — 87624 HPV HI-RISK TYP POOLED RSLT: CPT | Performed by: OBSTETRICS & GYNECOLOGY

## 2025-02-17 ASSESSMENT — EDINBURGH POSTNATAL DEPRESSION SCALE (EPDS)
I HAVE LOOKED FORWARD WITH ENJOYMENT TO THINGS: AS MUCH AS I EVER DID
I HAVE BEEN SO UNHAPPY THAT I HAVE HAD DIFFICULTY SLEEPING: NOT AT ALL
I HAVE BEEN ANXIOUS OR WORRIED FOR NO GOOD REASON: YES, SOMETIMES
TOTAL SCORE: 4
I HAVE BLAMED MYSELF UNNECESSARILY WHEN THINGS WENT WRONG: YES, SOME OF THE TIME
THE THOUGHT OF HARMING MYSELF HAS OCCURRED TO ME: NEVER
I HAVE BEEN ABLE TO LAUGH AND SEE THE FUNNY SIDE OF THINGS: AS MUCH AS I ALWAYS COULD
THINGS HAVE BEEN GETTING ON TOP OF ME: NO, I HAVE BEEN COPING AS WELL AS EVER
I HAVE FELT SAD OR MISERABLE: NO, NOT AT ALL
I HAVE BEEN SO UNHAPPY THAT I HAVE BEEN CRYING: NO, NEVER
I HAVE FELT SCARED OR PANICKY FOR NO GOOD REASON: NO, NOT AT ALL

## 2025-02-17 ASSESSMENT — PATIENT HEALTH QUESTIONNAIRE - PHQ9
SUM OF ALL RESPONSES TO PHQ9 QUESTIONS 1 AND 2: 0
1. LITTLE INTEREST OR PLEASURE IN DOING THINGS: NOT AT ALL
2. FEELING DOWN, DEPRESSED OR HOPELESS: NOT AT ALL

## 2025-02-17 ASSESSMENT — COLUMBIA-SUICIDE SEVERITY RATING SCALE - C-SSRS
6. HAVE YOU EVER DONE ANYTHING, STARTED TO DO ANYTHING, OR PREPARED TO DO ANYTHING TO END YOUR LIFE?: NO
2. HAVE YOU ACTUALLY HAD ANY THOUGHTS OF KILLING YOURSELF?: NO
1. IN THE PAST MONTH, HAVE YOU WISHED YOU WERE DEAD OR WISHED YOU COULD GO TO SLEEP AND NOT WAKE UP?: NO

## 2025-02-17 ASSESSMENT — PAIN SCALES - GENERAL: PAINLEVEL_OUTOF10: 0-NO PAIN

## 2025-02-17 NOTE — PROGRESS NOTES
"Postpartum Follow-up Visit     Subjective   Dina Goncalves is a 20 y.o.  female here for postpartum visit after a mode of delivery: spontaneous vaginal delivery  She delivered at delivery location: [facility] on 2024 by Dr. Arriaga.  The delivery was complicated by none  She had a boy that weighed 6.15 lbs and she named Mohan.  She is Breast Feeding.  Her bleeding is described has stopped.   She denies feelings of depression, anxiety, tearfulness, inability to cope or restlessness.  She has not resumed sexual activity.  She has decided on birthcontrol: oral contraception  She has not had a pap.     @ROS@    Obstetrical History   OB History    Para Term  AB Living   2 2 2     2   SAB IAB Ectopic Multiple Live Births         0 2      # Outcome Date GA Lbr Loc/2nd Weight Sex Type Anes PTL Lv   2 Term 25 39w0d  3.14 kg M Vag-Spont EPI  GRETA   1 Term 23 39w0d  2.835 kg F Vag-Spont EPI N GRETA       Past Medical History  History reviewed. No pertinent past medical history.     Past Surgical History   History reviewed. No pertinent surgical history.    Social History  Social History     Tobacco Use    Smoking status: Never    Smokeless tobacco: Never   Substance Use Topics    Alcohol use: Not Currently     Substance and Sexual Activity   Drug Use Never       Allergies  Patient has no known allergies.     Medications  (Not in a hospital admission)      OBJECTIVE  /74   Ht 1.753 m (5' 9\")   Wt 57.2 kg (126 lb)   LMP 2024 (Approximate)   Breastfeeding Yes   BMI 18.61 kg/m²     Physical Exam  Constitutional:       General: She is not in acute distress.     Appearance: Normal appearance.   Pulmonary:      Effort: Pulmonary effort is normal.   Abdominal:      General: Bowel sounds are normal.      Palpations: Abdomen is soft.      Uterus firm, nontender, normal size  Musculoskeletal:         General: Normal range of motion.   Neurological:      Mental Status: She is alert. "   Psychiatric:         Mood and Affect: Mood normal.   Pelvic:    Vulva: normal appearance, Perineum well healed    Perineum: well supported, well healed    Vagina: well healed, no noted defects, normal discharge    Cervix:  normal appearance, no CMT    Uterus: normal size, nontender    Adnexa: no masses, nontender    PP visit following:  The patient has recovered well from her delivery and may resume all normal activity.  She was counseled on her options for birth control including risks and benefits of each method.  She was instructed to continue with a daily vitamin.  Counseling was provided regarding risks of close interval pregnancies.  Signs and symptoms of postpartum depression were reviewed.  She will call should any new concerns arise.  -pap today  -breastfeeding  -no current partner, prefers to wait on BC but will call if something changes  .pro

## 2025-02-20 LAB
CYTOLOGY CMNT CVX/VAG CYTO-IMP: NORMAL
LAB AP HPV HR: NORMAL
LABORATORY COMMENT REPORT: NORMAL
PATH REPORT.TOTAL CANCER: NORMAL

## 2025-02-24 LAB — TEST COMMENT - SURGICAL SENDOUT REQUEST: NORMAL

## 2025-02-26 LAB
HPV HR 12 DNA GENITAL QL NAA+PROBE: NEGATIVE
HPV HR GENOTYPES PNL CVX NAA+PROBE: NEGATIVE
HPV16 DNA SPEC QL NAA+PROBE: NEGATIVE
HPV18 DNA SPEC QL NAA+PROBE: NEGATIVE